# Patient Record
Sex: MALE | Race: WHITE | NOT HISPANIC OR LATINO | Employment: FULL TIME | URBAN - METROPOLITAN AREA
[De-identification: names, ages, dates, MRNs, and addresses within clinical notes are randomized per-mention and may not be internally consistent; named-entity substitution may affect disease eponyms.]

---

## 2018-02-02 LAB — PSA SERPL-MCNC: <0.1 NG/ML (ref 0–4)

## 2018-08-23 ENCOUNTER — TELEPHONE (OUTPATIENT)
Dept: UROLOGY | Facility: MEDICAL CENTER | Age: 58
End: 2018-08-23

## 2018-08-23 DIAGNOSIS — Z85.46 PERSONAL HISTORY OF MALIGNANT NEOPLASM OF PROSTATE: Primary | ICD-10-CM

## 2018-09-17 RX ORDER — HYDROCHLOROTHIAZIDE 25 MG/1
TABLET ORAL
COMMUNITY

## 2018-09-17 RX ORDER — METOPROLOL SUCCINATE 100 MG/1
TABLET, EXTENDED RELEASE ORAL
COMMUNITY

## 2018-09-17 RX ORDER — LISINOPRIL 20 MG/1
TABLET ORAL
COMMUNITY

## 2018-09-17 RX ORDER — DOXAZOSIN MESYLATE 4 MG/1
TABLET ORAL
COMMUNITY

## 2018-09-17 RX ORDER — SIMVASTATIN 40 MG
TABLET ORAL
COMMUNITY

## 2018-09-17 RX ORDER — SILDENAFIL 100 MG/1
TABLET, FILM COATED ORAL
COMMUNITY
Start: 2017-08-09

## 2018-09-21 ENCOUNTER — OFFICE VISIT (OUTPATIENT)
Dept: UROLOGY | Facility: MEDICAL CENTER | Age: 58
End: 2018-09-21
Payer: COMMERCIAL

## 2018-09-21 VITALS
SYSTOLIC BLOOD PRESSURE: 132 MMHG | HEIGHT: 68 IN | WEIGHT: 254 LBS | DIASTOLIC BLOOD PRESSURE: 86 MMHG | BODY MASS INDEX: 38.49 KG/M2

## 2018-09-21 DIAGNOSIS — N52.31 ERECTILE DYSFUNCTION FOLLOWING RADICAL PROSTATECTOMY: ICD-10-CM

## 2018-09-21 DIAGNOSIS — Z90.79 HISTORY OF RADICAL PROSTATECTOMY: ICD-10-CM

## 2018-09-21 DIAGNOSIS — Z85.46 PERSONAL HISTORY OF MALIGNANT NEOPLASM OF PROSTATE: Primary | ICD-10-CM

## 2018-09-21 LAB
SL AMB  POCT GLUCOSE, UA: >=1000
SL AMB LEUKOCYTE ESTERASE,UA: ABNORMAL
SL AMB POCT BILIRUBIN,UA: ABNORMAL
SL AMB POCT BLOOD,UA: ABNORMAL
SL AMB POCT CLARITY,UA: CLEAR
SL AMB POCT COLOR,UA: YELLOW
SL AMB POCT KETONES,UA: ABNORMAL
SL AMB POCT NITRITE,UA: ABNORMAL
SL AMB POCT PH,UA: 5.5
SL AMB POCT SPECIFIC GRAVITY,UA: 1.01
SL AMB POCT URINE PROTEIN: 30
SL AMB POCT UROBILINOGEN: 0.2

## 2018-09-21 PROCEDURE — 99214 OFFICE O/P EST MOD 30 MIN: CPT | Performed by: UROLOGY

## 2018-09-21 PROCEDURE — 81003 URINALYSIS AUTO W/O SCOPE: CPT | Performed by: UROLOGY

## 2018-09-21 NOTE — PROGRESS NOTES
Assessment/Plan:      Diagnoses and all orders for this visit:    Personal history of malignant neoplasm of prostate  -     POCT urine dip auto non-scope  -     PSA Total, Diagnostic; Future    History of radical prostatectomy  -     PSA Total, Diagnostic; Future    Erectile dysfunction following radical prostatectomy    Other orders  -     sildenafil (VIAGRA) 100 mg tablet; Take by mouth  -     Aspirin Buf,CaCarb-MgCarb-MgO, (BUFFERED ASPIRIN) 325 MG TABS; Take 1 tablet by mouth daily  -     doxazosin (CARDURA) 4 mg tablet; Take by mouth  -     insulin detemir (LEVEMIR) 100 units/mL subcutaneous injection; Inject under the skin every 12 (twelve) hours  -     lisinopril (ZESTRIL) 20 mg tablet; Take by mouth  -     metoprolol succinate (TOPROL-XL) 100 mg 24 hr tablet; Take by mouth  -     simvastatin (ZOCOR) 40 mg tablet; Take by mouth  -     insulin degludec (TRESIBA FLEXTOUCH) 200 units/mL CONCENTRATED U-200 injection pen; Inject under the skin  -     liraglutide (VICTOZA) injection; Inject under the skin  -     hydrochlorothiazide (HYDRODIURIL) 25 mg tablet; Take by mouth  -     Discontinue: Dapagliflozin Propanediol (FARXIGA) 5 MG TABS; Take by mouth          Subjective:  No complaints     Patient ID: Eliceo Mcclelland is a 66 y o  male  HPI  He is about 3 and half years after his laparoscopic robotic radical prostatectomy for localized prostate cancer  He did not require any adjuvant treatment  His blood tests have been low since his prostate cancer treatment was completed  He states he has good urine control without any leakage or urinary symptoms  He does not have to strain or bear down to generate a urinary stream    He reports a good appetite and normal bowel function and has not had any unwanted weight loss or pains in new locations  He does have some ED with problems maintaining his erections, for which he takes Viagra p r n  Review of Systems   Constitutional: Negative  HENT: Negative  Eyes: Negative  Respiratory: Negative  Cardiovascular: Negative  Gastrointestinal: Negative  Endocrine: Negative  Genitourinary: Negative  Musculoskeletal: Negative  Skin: Negative  Allergic/Immunologic: Negative  Neurological: Negative  Hematological: Negative  Psychiatric/Behavioral: Negative  Objective:     Physical Exam   Constitutional: He is oriented to person, place, and time  He appears well-developed and well-nourished  No distress  HENT:   Head: Normocephalic and atraumatic  Nose: Nose normal    Mouth/Throat: Oropharynx is clear and moist    Eyes: Conjunctivae and EOM are normal  Pupils are equal, round, and reactive to light  No scleral icterus  Neck: Normal range of motion  Neck supple  Pulmonary/Chest: Effort normal  No respiratory distress  Abdominal: Soft  Bowel sounds are normal  He exhibits no distension and no mass  There is no tenderness  Musculoskeletal: Normal range of motion  He exhibits no edema or tenderness  Lymphadenopathy:     He has no cervical adenopathy  Neurological: He is alert and oriented to person, place, and time  No cranial nerve deficit  Skin: Skin is warm and dry  No rash noted  No erythema  No pallor  Psychiatric: He has a normal mood and affect  His behavior is normal  Judgment and thought content normal    Nursing note and vitals reviewed        PSA from 09/19/2018 was less than 0 1

## 2018-09-21 NOTE — LETTER
September 21, 2018     Blas Sommers, [address] [facility] [address] [id number]    Patient: Jillian Keating   YOB: 1960   Date of Visit: 9/21/2018       Dear Dr Seo Minium:    Thank you for referring Renetta Osorio to me for evaluation  Below are my notes for this consultation  If you have questions, please do not hesitate to call me  I look forward to following your patient along with you  Sincerely,        Rebeca Calhoun MD        CC: No Recipients  Rebeca Calhoun MD  9/21/2018 11:46 AM  Sign at close encounter  Assessment/Plan:      Diagnoses and all orders for this visit:    Personal history of malignant neoplasm of prostate  -     POCT urine dip auto non-scope  -     PSA Total, Diagnostic; Future    History of radical prostatectomy  -     PSA Total, Diagnostic; Future    Erectile dysfunction following radical prostatectomy    Other orders  -     sildenafil (VIAGRA) 100 mg tablet; Take by mouth  -     Aspirin Buf,CaCarb-MgCarb-MgO, (BUFFERED ASPIRIN) 325 MG TABS; Take 1 tablet by mouth daily  -     doxazosin (CARDURA) 4 mg tablet; Take by mouth  -     insulin detemir (LEVEMIR) 100 units/mL subcutaneous injection; Inject under the skin every 12 (twelve) hours  -     lisinopril (ZESTRIL) 20 mg tablet; Take by mouth  -     metoprolol succinate (TOPROL-XL) 100 mg 24 hr tablet; Take by mouth  -     simvastatin (ZOCOR) 40 mg tablet; Take by mouth  -     insulin degludec (TRESIBA FLEXTOUCH) 200 units/mL CONCENTRATED U-200 injection pen; Inject under the skin  -     liraglutide (VICTOZA) injection; Inject under the skin  -     hydrochlorothiazide (HYDRODIURIL) 25 mg tablet; Take by mouth  -     Discontinue: Dapagliflozin Propanediol (FARXIGA) 5 MG TABS; Take by mouth          Subjective:  No complaints     Patient ID: Jillian Keating is a 66 y o  male  HPI  He is about 3 and half years after his laparoscopic robotic radical prostatectomy for localized prostate cancer    He did not require any adjuvant treatment  His blood tests have been low since his prostate cancer treatment was completed  He states he has good urine control without any leakage or urinary symptoms  He does not have to strain or bear down to generate a urinary stream    He reports a good appetite and normal bowel function and has not had any unwanted weight loss or pains in new locations  He does have some ED with problems maintaining his erections, for which he takes Viagra p r n  Review of Systems   Constitutional: Negative  HENT: Negative  Eyes: Negative  Respiratory: Negative  Cardiovascular: Negative  Gastrointestinal: Negative  Endocrine: Negative  Genitourinary: Negative  Musculoskeletal: Negative  Skin: Negative  Allergic/Immunologic: Negative  Neurological: Negative  Hematological: Negative  Psychiatric/Behavioral: Negative  Objective:     Physical Exam   Constitutional: He is oriented to person, place, and time  He appears well-developed and well-nourished  No distress  HENT:   Head: Normocephalic and atraumatic  Nose: Nose normal    Mouth/Throat: Oropharynx is clear and moist    Eyes: Conjunctivae and EOM are normal  Pupils are equal, round, and reactive to light  No scleral icterus  Neck: Normal range of motion  Neck supple  Pulmonary/Chest: Effort normal  No respiratory distress  Abdominal: Soft  Bowel sounds are normal  He exhibits no distension and no mass  There is no tenderness  Musculoskeletal: Normal range of motion  He exhibits no edema or tenderness  Lymphadenopathy:     He has no cervical adenopathy  Neurological: He is alert and oriented to person, place, and time  No cranial nerve deficit  Skin: Skin is warm and dry  No rash noted  No erythema  No pallor  Psychiatric: He has a normal mood and affect  His behavior is normal  Judgment and thought content normal    Nursing note and vitals reviewed        PSA from 02/01/2018 was less than 0 1, a recent repeat level is pending

## 2018-10-10 ENCOUNTER — TELEPHONE (OUTPATIENT)
Dept: UROLOGY | Facility: MEDICAL CENTER | Age: 58
End: 2018-10-10

## 2020-02-25 ENCOUNTER — APPOINTMENT (OUTPATIENT)
Dept: RADIOLOGY | Facility: CLINIC | Age: 60
End: 2020-02-25
Payer: COMMERCIAL

## 2020-02-25 VITALS
DIASTOLIC BLOOD PRESSURE: 82 MMHG | WEIGHT: 283 LBS | HEART RATE: 84 BPM | SYSTOLIC BLOOD PRESSURE: 148 MMHG | HEIGHT: 68 IN | BODY MASS INDEX: 42.89 KG/M2

## 2020-02-25 DIAGNOSIS — M77.8 LEFT SHOULDER TENDONITIS: Primary | ICD-10-CM

## 2020-02-25 DIAGNOSIS — M25.512 LEFT SHOULDER PAIN, UNSPECIFIED CHRONICITY: ICD-10-CM

## 2020-02-25 PROCEDURE — 99203 OFFICE O/P NEW LOW 30 MIN: CPT | Performed by: ORTHOPAEDIC SURGERY

## 2020-02-25 PROCEDURE — 73030 X-RAY EXAM OF SHOULDER: CPT

## 2020-02-25 RX ORDER — METOPROLOL TARTRATE 100 MG/1
100 TABLET ORAL 2 TIMES DAILY
COMMUNITY
Start: 2020-01-26

## 2020-02-25 NOTE — PROGRESS NOTES
Assessment:     1  Left shoulder tendonitis        Plan:  The patient was seen and examined by Dr Bernardo Chi and myself  Problem List Items Addressed This Visit        Musculoskeletal and Integument    Left shoulder tendonitis - Primary     Findings consistent with left shoulder tendinitis, possible calcific tendinitis  Findings and treatment options were discussed with the patient  X-rays reviewed with him  Patient feels significant improvement since taking ibuprofen  He is able to do his normal activities with no pain  Recommend continuing ibuprofen as needed  Discussed importance of avoiding any repetitive overhead activities or repetitive lifting away from the body  Discussed importance of having an ergonomic set up at his work desk  Follow-up as needed if symptoms return  All questions were answered to patient's satisfaction  Relevant Orders    XR shoulder 2+ vw left         Subjective:     Patient ID: Myrtle Agudelo is a 64 y o  male  Chief Complaint: This is a 74-year-old white male here for evaluation of his left shoulder  He states he had a gradual onset of pain that began 5 days ago  He woke up with mild discomfort in his left shoulder  He denies any injury or change in activities leading up to that time  The next day he woke up with significant pain in his left shoulder and cannot lift his arm  He was seen emergency room that day and x-rays were taken  He was told he may have a degenerative rotator cuff tear and was referred to Orthopedics  He started taking ibuprofen and states he feels significant improvement  He is able to move his arm with minimal discomfort  He denies any prior injury to that shoulder  Patient intake form was reviewed today       Allergy:  No Known Allergies  Medications:  all current active meds have been reviewed  Past Medical History:  Past Medical History:   Diagnosis Date    Acquired absence of other genital organ(s)     Elevated PSA     Erectile dysfunction following radical prostatectomy     Personal history of malignant neoplasm of prostate     Urinary incontinence, stress, male      Past Surgical History:  Past Surgical History:   Procedure Laterality Date    LYMPHADENECTOMY Bilateral 2015    PROSTATE BIOPSY  2015    PROSTATECTOMY  2015     Family History:  Family History   Problem Relation Age of Onset    Hypertension Mother     Heart disease Mother     Stroke Mother     Hypertension Father     Diabetes Father     Heart disease Father      Social History:  Social History     Substance and Sexual Activity   Alcohol Use Yes    Comment: social     Social History     Substance and Sexual Activity   Drug Use No     Social History     Tobacco Use   Smoking Status Current Some Day Smoker   Smokeless Tobacco Never Used     Review of Systems   Constitutional: Negative  Negative for chills and fever  HENT: Negative  Negative for drooling and sneezing  Eyes: Negative  Negative for redness  Respiratory: Negative  Negative for cough, shortness of breath and wheezing  Cardiovascular: Negative  Gastrointestinal: Negative  Endocrine: Negative  Genitourinary: Negative  Musculoskeletal: Positive for arthralgias  Skin: Negative  Neurological: Negative  Hematological: Negative  Psychiatric/Behavioral: Negative  The patient is not nervous/anxious  Objective:  BP Readings from Last 1 Encounters:   02/25/20 148/82      Wt Readings from Last 1 Encounters:   02/25/20 128 kg (283 lb)      BMI:   Estimated body mass index is 43 03 kg/m² as calculated from the following:    Height as of this encounter: 5' 8" (1 727 m)  Weight as of this encounter: 128 kg (283 lb)  BSA:   Estimated body surface area is 2 37 meters squared as calculated from the following:    Height as of this encounter: 5' 8" (1 727 m)  Weight as of this encounter: 128 kg (283 lb)     Physical Exam   Constitutional: He is oriented to person, place, and time  He appears well-developed and well-nourished  HENT:   Head: Normocephalic and atraumatic  Eyes: Pupils are equal, round, and reactive to light  Conjunctivae and EOM are normal    Neck: Neck supple  Pulmonary/Chest: Effort normal and breath sounds normal    Neurological: He is alert and oriented to person, place, and time  He has normal reflexes  Skin: Skin is warm and dry  Psychiatric: He has a normal mood and affect  His behavior is normal  Judgment and thought content normal    Nursing note and vitals reviewed  Left Shoulder Exam     Tenderness   The patient is experiencing no tenderness  Range of Motion   The patient has normal left shoulder ROM  Muscle Strength   The patient has normal left shoulder strength  Abduction: 5/5   Internal rotation: 5/5   External rotation: 5/5   Biceps: 5/5     Tests   Apprehension: negative  Boykin test: negative  Cross arm: negative  Impingement: negative  Drop arm: negative    Other   Erythema: absent  Scars: absent  Sensation: normal  Pulse: present     Comments:  Minimal discomfort with empty can and resisted abduction  Negative speed's            I have personally reviewed pertinent films in PACS and my interpretation is X-rays left shoulder reveal mild AC joint osteoarthritis  No soft tissue calcifications visible

## 2020-02-25 NOTE — ASSESSMENT & PLAN NOTE
Findings consistent with left shoulder tendinitis, possible calcific tendinitis  Findings and treatment options were discussed with the patient  X-rays reviewed with him  Patient feels significant improvement since taking ibuprofen  He is able to do his normal activities with no pain  Recommend continuing ibuprofen as needed  Discussed importance of avoiding any repetitive overhead activities or repetitive lifting away from the body  Discussed importance of having an ergonomic set up at his work desk  Follow-up as needed if symptoms return  All questions were answered to patient's satisfaction

## 2023-01-12 PROBLEM — M25.561 CHRONIC PAIN OF BOTH KNEES: Status: ACTIVE | Noted: 2021-09-01

## 2023-01-12 PROBLEM — Z86.010 PERSONAL HISTORY OF COLONIC POLYPS: Status: ACTIVE | Noted: 2017-08-30

## 2023-01-12 PROBLEM — Z98.61 CORONARY ANGIOPLASTY STATUS: Status: ACTIVE | Noted: 2017-05-08

## 2023-01-12 PROBLEM — N52.31 ERECTILE DYSFUNCTION FOLLOWING RADICAL PROSTATECTOMY: Status: ACTIVE | Noted: 2017-10-17

## 2023-01-12 PROBLEM — M25.562 CHRONIC PAIN OF BOTH KNEES: Status: ACTIVE | Noted: 2021-09-01

## 2023-01-12 PROBLEM — Z95.1 S/P CABG X 3: Status: ACTIVE | Noted: 2020-09-05

## 2023-01-12 PROBLEM — Z85.46 PERSONAL HISTORY OF MALIGNANT NEOPLASM OF PROSTATE: Status: ACTIVE | Noted: 2023-01-12

## 2023-01-12 PROBLEM — I71.20 THORACIC AORTIC ANEURYSM WITHOUT RUPTURE: Status: ACTIVE | Noted: 2021-11-10

## 2023-01-12 PROBLEM — G89.29 CHRONIC PAIN OF BOTH KNEES: Status: ACTIVE | Noted: 2021-09-01

## 2023-01-12 PROBLEM — N18.30 STAGE 3 CHRONIC KIDNEY DISEASE (HCC): Status: ACTIVE | Noted: 2019-09-16

## 2023-01-31 ENCOUNTER — LAB REQUISITION (OUTPATIENT)
Dept: LAB | Facility: HOSPITAL | Age: 63
End: 2023-01-31

## 2023-01-31 DIAGNOSIS — Z12.11 ENCOUNTER FOR SCREENING FOR MALIGNANT NEOPLASM OF COLON: ICD-10-CM

## 2023-01-31 DIAGNOSIS — K62.1 RECTAL POLYP: ICD-10-CM
